# Patient Record
Sex: FEMALE | Race: WHITE | NOT HISPANIC OR LATINO | Employment: FULL TIME | URBAN - METROPOLITAN AREA
[De-identification: names, ages, dates, MRNs, and addresses within clinical notes are randomized per-mention and may not be internally consistent; named-entity substitution may affect disease eponyms.]

---

## 2023-04-19 RX ORDER — CONJUGATED ESTROGENS AND MEDROXYPROGESTERONE ACETATE .3; 1.5 MG/1; MG/1
TABLET, SUGAR COATED ORAL
COMMUNITY
Start: 2015-03-26 | End: 2023-12-07 | Stop reason: SDUPTHER

## 2023-04-19 RX ORDER — AZELAIC ACID 0.15 G/G
GEL TOPICAL
COMMUNITY
Start: 2015-02-19 | End: 2024-02-12

## 2023-04-19 RX ORDER — SOD SULF/POT CHLORIDE/MAG SULF 1.479 G
TABLET ORAL
COMMUNITY
Start: 2022-04-12 | End: 2023-04-20 | Stop reason: ALTCHOICE

## 2023-04-20 ENCOUNTER — OFFICE VISIT (OUTPATIENT)
Dept: PRIMARY CARE | Facility: CLINIC | Age: 63
End: 2023-04-20
Payer: COMMERCIAL

## 2023-04-20 VITALS
WEIGHT: 117 LBS | TEMPERATURE: 94.5 F | DIASTOLIC BLOOD PRESSURE: 70 MMHG | BODY MASS INDEX: 22.09 KG/M2 | SYSTOLIC BLOOD PRESSURE: 112 MMHG | HEIGHT: 61 IN

## 2023-04-20 DIAGNOSIS — R09.82 POST-NASAL DRIP: ICD-10-CM

## 2023-04-20 DIAGNOSIS — K58.0 IRRITABLE BOWEL SYNDROME WITH DIARRHEA: ICD-10-CM

## 2023-04-20 DIAGNOSIS — J32.0 CHRONIC MAXILLARY SINUSITIS: ICD-10-CM

## 2023-04-20 DIAGNOSIS — K21.9 GASTROESOPHAGEAL REFLUX DISEASE WITHOUT ESOPHAGITIS: Primary | ICD-10-CM

## 2023-04-20 PROBLEM — N95.1 MENOPAUSAL SYMPTOMS: Status: ACTIVE | Noted: 2023-04-20

## 2023-04-20 PROCEDURE — 1036F TOBACCO NON-USER: CPT | Performed by: PHYSICIAN ASSISTANT

## 2023-04-20 PROCEDURE — 99204 OFFICE O/P NEW MOD 45 MIN: CPT | Performed by: PHYSICIAN ASSISTANT

## 2023-04-20 RX ORDER — AZELASTINE 1 MG/ML
SPRAY, METERED NASAL
COMMUNITY
Start: 2022-09-21

## 2023-04-20 ASSESSMENT — ENCOUNTER SYMPTOMS
NAUSEA: 1
HEADACHES: 0
DIARRHEA: 1
COUGH: 0
MYALGIAS: 0
PALPITATIONS: 0
NUMBNESS: 0
TREMORS: 0
JOINT SWELLING: 0
ABDOMINAL PAIN: 1
ABDOMINAL DISTENTION: 0
LOSS OF SENSATION IN FEET: 0
DEPRESSION: 0
DIFFICULTY URINATING: 0
FREQUENCY: 0
ARTHRALGIAS: 0
EYE PAIN: 0
SHORTNESS OF BREATH: 0
CONSTIPATION: 0
NERVOUS/ANXIOUS: 0
CONFUSION: 0
HEMATURIA: 0
SORE THROAT: 0
WHEEZING: 0
EYE DISCHARGE: 0
POLYPHAGIA: 0
POLYDIPSIA: 0
WEAKNESS: 0
CHOKING: 0
APPETITE CHANGE: 0
CHILLS: 0
VOMITING: 0
FACIAL SWELLING: 0
CHEST TIGHTNESS: 0
FATIGUE: 0
ANAL BLEEDING: 0
COLOR CHANGE: 0
FEVER: 0
DIZZINESS: 0
SLEEP DISTURBANCE: 0
OCCASIONAL FEELINGS OF UNSTEADINESS: 0

## 2023-04-20 ASSESSMENT — PATIENT HEALTH QUESTIONNAIRE - PHQ9
10. IF YOU CHECKED OFF ANY PROBLEMS, HOW DIFFICULT HAVE THESE PROBLEMS MADE IT FOR YOU TO DO YOUR WORK, TAKE CARE OF THINGS AT HOME, OR GET ALONG WITH OTHER PEOPLE: NOT DIFFICULT AT ALL
1. LITTLE INTEREST OR PLEASURE IN DOING THINGS: SEVERAL DAYS
2. FEELING DOWN, DEPRESSED OR HOPELESS: NOT AT ALL
SUM OF ALL RESPONSES TO PHQ9 QUESTIONS 1 AND 2: 1

## 2023-04-20 ASSESSMENT — COLUMBIA-SUICIDE SEVERITY RATING SCALE - C-SSRS
1. IN THE PAST MONTH, HAVE YOU WISHED YOU WERE DEAD OR WISHED YOU COULD GO TO SLEEP AND NOT WAKE UP?: NO
6. HAVE YOU EVER DONE ANYTHING, STARTED TO DO ANYTHING, OR PREPARED TO DO ANYTHING TO END YOUR LIFE?: NO
2. HAVE YOU ACTUALLY HAD ANY THOUGHTS OF KILLING YOURSELF?: NO

## 2023-04-20 NOTE — PROGRESS NOTES
"Subjective   Patient ID: Tammy Posada is a 63 y.o. female with a history of chronic sinusitis, postnasal drip, menopausal symptoms on Prempro, s/p right ACL repair in 1999, IBS with diarrhea who presents for Abdominal Pain.    HPI the patient is presented to become established as a new patient with a primary care provider.  She developed stomach cramps, epigastric pain and bloating a week ago after eating \" rich Easter food\" which she is not used to since she usually eats healthy food and tries to avoid fried or junk food.  She was nauseated and had loose stools.  Denies vomiting.  She started a blend diet and Pepcid 10 mg which improved her symptoms.  She had firm stool this morning and no more nausea.  Denies melena or hematochezia. Her colonoscopy was done in June 2022.  Was found to have couple of polyps.  She follows with GYN regularly.  Her Pap smear is up-to-date.  Mammogram was ordered but the patient had not done it yet.  She exercises 4-5 times a week and once a week with a .  Also rides horses.  She does not smoke.    Review of Systems   Constitutional:  Negative for appetite change, chills, fatigue and fever.   HENT:  Negative for congestion, ear pain, facial swelling, hearing loss, nosebleeds and sore throat.    Eyes:  Negative for pain, discharge and visual disturbance.   Respiratory:  Negative for cough, choking, chest tightness, shortness of breath and wheezing.    Cardiovascular:  Negative for chest pain, palpitations and leg swelling.   Gastrointestinal:  Positive for abdominal pain, diarrhea and nausea. Negative for abdominal distention, anal bleeding, constipation and vomiting.   Endocrine: Negative for cold intolerance, heat intolerance, polydipsia, polyphagia and polyuria.   Genitourinary:  Negative for difficulty urinating, frequency, hematuria and urgency.   Musculoskeletal:  Negative for arthralgias, gait problem, joint swelling and myalgias.   Skin:  Negative for color " "change and rash.   Neurological:  Negative for dizziness, tremors, syncope, weakness, numbness and headaches.   Psychiatric/Behavioral:  Negative for behavioral problems, confusion, sleep disturbance and suicidal ideas. The patient is not nervous/anxious.        Objective   /70   Temp 34.7 °C (94.5 °F)   Ht 1.549 m (5' 1\")   Wt 53.1 kg (117 lb)   BMI 22.11 kg/m²     Physical Exam  Constitutional:       General: She is not in acute distress.     Appearance: Normal appearance.   HENT:      Head: Normocephalic and atraumatic.      Nose: Nose normal.   Eyes:      Extraocular Movements: Extraocular movements intact.      Conjunctiva/sclera: Conjunctivae normal.      Pupils: Pupils are equal, round, and reactive to light.   Cardiovascular:      Rate and Rhythm: Normal rate and regular rhythm.      Pulses: Normal pulses.      Heart sounds: Normal heart sounds.   Pulmonary:      Effort: Pulmonary effort is normal.      Breath sounds: Normal breath sounds.   Abdominal:      General: Bowel sounds are normal.      Palpations: Abdomen is soft.   Musculoskeletal:         General: Normal range of motion.      Cervical back: Normal range of motion and neck supple.   Neurological:      General: No focal deficit present.      Mental Status: She is alert and oriented to person, place, and time.   Psychiatric:         Mood and Affect: Mood normal.         Behavior: Behavior normal.         Thought Content: Thought content normal.         Judgment: Judgment normal.         Assessment/Plan   Epigastric pain  Due to acid reflux  Avoid caffeine and alcoholic beverages  Avoid spicy and acidic food, such as tomato and citrus fruits  Avoid onions, peppermint and spearmint   Eat small, frequent meals  Do not eat late at night, at least 3 hours before bed  Raise the head of the bed 6-8 inches for sleeping  Wear lose-fitting clothes around your waist   Take famotidine 20 mg 30 minutes before breakfast and 30 minutes before " dinner    Post nasal drip, chronic sinusitis  Continue azelastine nasal spray daily    IBS with diarrhea  Stable  Taking Metamucil daily     Menopausal symptoms  Continue Prempro as directed  Follow-up with GYN as scheduled    Health maintenance  Colonoscopy June 2022   Follow-up with GYN Dr. Sterling   Pap smear February 2022   Mammogram ordered by GYN, not scheduled yet    Continue to exercise 4-5 times a week  Continue riding horses  Continue healthy diet

## 2023-12-07 ENCOUNTER — TELEPHONE (OUTPATIENT)
Dept: OBSTETRICS AND GYNECOLOGY | Facility: CLINIC | Age: 63
End: 2023-12-07
Payer: COMMERCIAL

## 2023-12-07 DIAGNOSIS — N95.1 MENOPAUSAL SYMPTOMS: Primary | ICD-10-CM

## 2023-12-07 RX ORDER — CONJUGATED ESTROGENS AND MEDROXYPROGESTERONE ACETATE .3; 1.5 MG/1; MG/1
1 TABLET, SUGAR COATED ORAL DAILY
Qty: 30 TABLET | Refills: 2 | Status: SHIPPED | OUTPATIENT
Start: 2023-12-07 | End: 2024-02-12 | Stop reason: SDUPTHER

## 2024-02-12 ENCOUNTER — OFFICE VISIT (OUTPATIENT)
Dept: OBSTETRICS AND GYNECOLOGY | Facility: CLINIC | Age: 64
End: 2024-02-12
Payer: COMMERCIAL

## 2024-02-12 VITALS
SYSTOLIC BLOOD PRESSURE: 114 MMHG | DIASTOLIC BLOOD PRESSURE: 78 MMHG | WEIGHT: 116 LBS | HEIGHT: 63 IN | HEART RATE: 78 BPM | BODY MASS INDEX: 20.55 KG/M2

## 2024-02-12 DIAGNOSIS — N95.1 MENOPAUSAL SYMPTOMS: ICD-10-CM

## 2024-02-12 DIAGNOSIS — Z01.419 ENCOUNTER FOR GYNECOLOGICAL EXAMINATION WITHOUT ABNORMAL FINDING: Primary | ICD-10-CM

## 2024-02-12 PROCEDURE — 87624 HPV HI-RISK TYP POOLED RSLT: CPT

## 2024-02-12 PROCEDURE — 1036F TOBACCO NON-USER: CPT | Performed by: OBSTETRICS & GYNECOLOGY

## 2024-02-12 PROCEDURE — 99396 PREV VISIT EST AGE 40-64: CPT | Performed by: OBSTETRICS & GYNECOLOGY

## 2024-02-12 PROCEDURE — 88175 CYTOPATH C/V AUTO FLUID REDO: CPT

## 2024-02-12 RX ORDER — FLUTICASONE PROPIONATE 50 MCG
1 SPRAY, SUSPENSION (ML) NASAL DAILY
COMMUNITY

## 2024-02-12 RX ORDER — BISMUTH SUBSALICYLATE 262 MG
1 TABLET,CHEWABLE ORAL DAILY
COMMUNITY

## 2024-02-12 RX ORDER — CONJUGATED ESTROGENS AND MEDROXYPROGESTERONE ACETATE .3; 1.5 MG/1; MG/1
1 TABLET, SUGAR COATED ORAL DAILY
Qty: 90 TABLET | Refills: 3 | Status: SHIPPED | OUTPATIENT
Start: 2024-02-12 | End: 2025-02-11

## 2024-02-12 RX ORDER — ERGOCALCIFEROL 1.25 MG/1
1.25 CAPSULE ORAL
COMMUNITY

## 2024-02-12 NOTE — PROGRESS NOTES
"Subjective   Tammy Posada is a 63 y.o. female here for a routine exam. Current complaints: We discussed that she continues Prempro which she finds highly enhances her quality of life.  She has discontinued in the past but finds there is \"brain fog\".  She requests a refill.    There is no postmenopausal bleeding or pelvic pain.  No dysuria, no change in bowel habits or vaginal discharge.  She occasionally has stress incontinence symptoms.  I recommend Kegel exercises.    She is current on her colonoscopy.    She does have a history of LEEP biopsy in the past.. Personal health questionnaire reviewed: yes.     Gynecologic History  No LMP recorded. Patient is postmenopausal.  Contraception: post menopausal status  Last Pap: 22. Results were: normal  Last mammogram: 23. Results were: normal    Obstetric History  OB History    Para Term  AB Living   1 1           SAB IAB Ectopic Multiple Live Births                  # Outcome Date GA Lbr Gilberto/2nd Weight Sex Delivery Anes PTL Lv   1 Para                Objective   Constitutional: Alert and in no acute distress. Well developed, well nourished.   Head and Face: Head and face: Normal.    Eyes: Normal external exam - nonicteric sclera, extraocular movements intact (EOMI) and no ptosis.   Neck: No neck asymmetry. Supple. Thyroid not enlarged and there were no palpable thyroid nodules.    Pulmonary: No respiratory distress.   Chest: Breasts: Normal appearance, no nipple discharge and no skin changes. Palpation of breasts and axillae: No palpable mass and no axillary lymphadenopathy.   Abdomen: Soft nontender; no abdominal mass palpated. No organomegaly. No hernias.   Genitourinary: External genitalia: Normal. No inguinal lymphadenopathy. Bartholin's Urethral and Skenes Glands: Normal. Urethra: Normal.  Bladder: Normal on palpation. Vagina: Normal. Cervix: Normal.  Uterus: Normal.  Right Adnexa/parametria: Normal.  Left Adnexa/parametria: Normal.  " Inspection of Perianal Area: Normal.   Musculoskeletal: No joint swelling seen, normal movements of all extremities.   Skin: Normal skin color and pigmentation, normal skin turgor, and no rash.   Neurologic: Non-focal. Grossly intact.   Psychiatric: Alert and oriented x 3. Affect normal to patient baseline. Mood: Appropriate.  Physical Exam     Assessment/Plan   Healthy female exam.  This is a 63-year-old female with a normal exam.  A Pap smear was sent, she has a history of a LEEP biopsy.    Her routine mammogram is due May 2024.    A printed prescription for Prempro was given to the patient.  She declines discontinuing as it is greatly improving her menopausal symptoms.  I will see her routinely in 1 year.  Mammogram ordered.

## 2024-03-01 LAB
CYTOLOGY CMNT CVX/VAG CYTO-IMP: NORMAL
HPV HR 12 DNA GENITAL QL NAA+PROBE: NEGATIVE
HPV HR GENOTYPES PNL CVX NAA+PROBE: NEGATIVE
HPV16 DNA SPEC QL NAA+PROBE: NEGATIVE
HPV18 DNA SPEC QL NAA+PROBE: NEGATIVE
LAB AP CONTRACEPTIVE HISTORY: NORMAL
LAB AP HPV GENOTYPE QUESTION: YES
LAB AP HPV HR: NORMAL
LAB AP TREATMENT HISTORY: NORMAL
LABORATORY COMMENT REPORT: NORMAL
MENSTRUAL HX REPORTED: NORMAL
PATH REPORT.TOTAL CANCER: NORMAL

## 2024-03-19 ENCOUNTER — TELEPHONE (OUTPATIENT)
Dept: OBSTETRICS AND GYNECOLOGY | Facility: CLINIC | Age: 64
End: 2024-03-19
Payer: COMMERCIAL

## 2024-04-30 DIAGNOSIS — Z12.31 VISIT FOR SCREENING MAMMOGRAM: Primary | ICD-10-CM

## 2024-05-10 ENCOUNTER — HOSPITAL ENCOUNTER (OUTPATIENT)
Dept: RADIOLOGY | Facility: HOSPITAL | Age: 64
Discharge: HOME | End: 2024-05-10
Payer: COMMERCIAL

## 2024-05-10 VITALS — HEIGHT: 63 IN | BODY MASS INDEX: 19.84 KG/M2 | WEIGHT: 112 LBS

## 2024-05-10 DIAGNOSIS — Z12.31 ENCOUNTER FOR SCREENING MAMMOGRAM FOR MALIGNANT NEOPLASM OF BREAST: ICD-10-CM

## 2024-05-10 DIAGNOSIS — Z01.419 ENCOUNTER FOR GYNECOLOGICAL EXAMINATION WITHOUT ABNORMAL FINDING: ICD-10-CM

## 2024-05-10 PROCEDURE — 77067 SCR MAMMO BI INCL CAD: CPT | Performed by: RADIOLOGY

## 2024-05-10 PROCEDURE — 77063 BREAST TOMOSYNTHESIS BI: CPT | Performed by: RADIOLOGY

## 2024-05-10 PROCEDURE — 77067 SCR MAMMO BI INCL CAD: CPT

## 2024-09-16 NOTE — TELEPHONE ENCOUNTER
"Patient is calling back about her Prempro and cutting the dose in half. I relayed your message \"Yes,  I believe you can cut them in half.  Only the enteric-coated medications need to be taken whole.\" Patient states she believes the medication is enteric-coated. Please advise. Patient contact number has been verified.    English Dwyer    "
PT has a delay in her Prempro from [facility], her daughter was on a stronger dose, she's wondering if she can cut them in half and take her's in the mean time. SEAN  
Talk to the patient.  She is on Prempro low-dose, but it is unavailable for about another month.  She questions if she could use her daughters Prempro 0.625 and break it in half.  I discussed she can cut it in half, and alternative could be to order the Premarin and the Provera separately if she desires.  
Yes,  I believe you can cut them in half.  Only the enteric-coated medications need to be taken whole.  
no

## 2025-01-20 ENCOUNTER — TELEPHONE (OUTPATIENT)
Facility: CLINIC | Age: 65
End: 2025-01-20
Payer: COMMERCIAL

## 2025-01-20 DIAGNOSIS — N95.1 MENOPAUSAL SYMPTOMS: ICD-10-CM

## 2025-01-20 RX ORDER — CONJUGATED ESTROGENS AND MEDROXYPROGESTERONE ACETATE .3; 1.5 MG/1; MG/1
1 TABLET, SUGAR COATED ORAL DAILY
Qty: 90 TABLET | Refills: 3 | Status: SHIPPED | OUTPATIENT
Start: 2025-01-20 | End: 2026-01-20

## 2025-01-20 NOTE — TELEPHONE ENCOUNTER
PT needs Prempro rx mailed to her so she can get it on time. [place].     Chelita Acosta MA

## 2025-02-17 ENCOUNTER — APPOINTMENT (OUTPATIENT)
Dept: OBSTETRICS AND GYNECOLOGY | Facility: CLINIC | Age: 65
End: 2025-02-17
Payer: COMMERCIAL

## 2025-02-20 ENCOUNTER — APPOINTMENT (OUTPATIENT)
Facility: CLINIC | Age: 65
End: 2025-02-20
Payer: COMMERCIAL

## 2025-02-20 VITALS
SYSTOLIC BLOOD PRESSURE: 136 MMHG | BODY MASS INDEX: 21.61 KG/M2 | HEART RATE: 84 BPM | WEIGHT: 122 LBS | DIASTOLIC BLOOD PRESSURE: 86 MMHG

## 2025-02-20 DIAGNOSIS — Z78.0 ASYMPTOMATIC MENOPAUSAL STATE: Primary | ICD-10-CM

## 2025-02-20 DIAGNOSIS — Z01.419 ENCOUNTER FOR GYNECOLOGICAL EXAMINATION WITHOUT ABNORMAL FINDING: ICD-10-CM

## 2025-02-20 NOTE — PROGRESS NOTES
Subjective   Tammy Posada is a 65 y.o. female here for a routine exam.  She has no postmenopausal bleeding or discharge.  No dysuria or change in bowel habits.  She is current on her colonoscopy from 2022.   She has history of LEEP in the early .    She has recently had a big promotion although there is more stress.    Personal health questionnaire reviewed: yes.     Gynecologic History  No LMP recorded. Patient is postmenopausal.  Contraception: post menopausal status  Last Pap: 24. Results were: normal  Last mammogram: 5/10/24. Results were: normal    Obstetric History  OB History    Para Term  AB Living   1 1 1         SAB IAB Ectopic Multiple Live Births                  # Outcome Date GA Lbr Gilberto/2nd Weight Sex Type Anes PTL Lv   1 Term                Objective   Constitutional: Alert and in no acute distress. Well developed, well nourished.   Head and Face: Head and face: Normal.    Eyes: Normal external exam - nonicteric sclera, extraocular movements intact (EOMI) and no ptosis.   Neck: No neck asymmetry. Supple. Thyroid not enlarged and there were no palpable thyroid nodules.    Pulmonary: No respiratory distress.   Chest: Breasts: Normal appearance, no nipple discharge and no skin changes. Palpation of breasts and axillae: No palpable mass and no axillary lymphadenopathy.   Abdomen: Soft nontender; no abdominal mass palpated. No organomegaly. No hernias.   Genitourinary: External genitalia: Normal. No inguinal lymphadenopathy. Bartholin's Urethral and Skenes Glands: Normal. Urethra: Normal.  Bladder: Normal on palpation. Vagina: Normal. Cervix: Normal.  Uterus: Normal.  Right Adnexa/parametria: Normal.  Left Adnexa/parametria: Normal.  Inspection of Perianal Area: Normal.   Musculoskeletal: No joint swelling seen, normal movements of all extremities.   Skin: Normal skin color and pigmentation, normal skin turgor, and no rash.   Neurologic: Non-focal. Grossly intact.    Psychiatric: Alert and oriented x 3. Affect normal to patient baseline. Mood: Appropriate.  Physical Exam     Assessment/Plan   Healthy female exam.  This is a 65-year-old female with a normal exam.  No Pap smear was sent, she is high risk HPV negative in 2024.    Her routine mammogram is due in May 2025.    We discussed a bone density test to evaluate for osteoporosis.  She does use Prempro for menopausal symptoms and is current on prescription through [facility].    I will see her routinely in 1 year.  Education reviewed: self breast exams.  Mammogram ordered.

## 2025-04-11 ENCOUNTER — OFFICE VISIT (OUTPATIENT)
Dept: PRIMARY CARE | Facility: CLINIC | Age: 65
End: 2025-04-11
Payer: COMMERCIAL

## 2025-04-11 VITALS
TEMPERATURE: 96.8 F | DIASTOLIC BLOOD PRESSURE: 72 MMHG | WEIGHT: 116 LBS | SYSTOLIC BLOOD PRESSURE: 110 MMHG | HEIGHT: 60 IN | BODY MASS INDEX: 22.78 KG/M2

## 2025-04-11 DIAGNOSIS — H69.93 DISORDER OF BOTH EUSTACHIAN TUBES: ICD-10-CM

## 2025-04-11 DIAGNOSIS — R42 DIZZINESS: Primary | ICD-10-CM

## 2025-04-11 PROCEDURE — 1036F TOBACCO NON-USER: CPT | Performed by: PHYSICIAN ASSISTANT

## 2025-04-11 PROCEDURE — 3008F BODY MASS INDEX DOCD: CPT | Performed by: PHYSICIAN ASSISTANT

## 2025-04-11 PROCEDURE — 1158F ADVNC CARE PLAN TLK DOCD: CPT | Performed by: PHYSICIAN ASSISTANT

## 2025-04-11 PROCEDURE — 1160F RVW MEDS BY RX/DR IN RCRD: CPT | Performed by: PHYSICIAN ASSISTANT

## 2025-04-11 PROCEDURE — 1125F AMNT PAIN NOTED PAIN PRSNT: CPT | Performed by: PHYSICIAN ASSISTANT

## 2025-04-11 PROCEDURE — 99213 OFFICE O/P EST LOW 20 MIN: CPT | Performed by: PHYSICIAN ASSISTANT

## 2025-04-11 PROCEDURE — 1159F MED LIST DOCD IN RCRD: CPT | Performed by: PHYSICIAN ASSISTANT

## 2025-04-11 PROCEDURE — 1123F ACP DISCUSS/DSCN MKR DOCD: CPT | Performed by: PHYSICIAN ASSISTANT

## 2025-04-11 RX ORDER — CIPROFLOXACIN AND DEXAMETHASONE 3; 1 MG/ML; MG/ML
SUSPENSION/ DROPS AURICULAR (OTIC)
COMMUNITY
Start: 2025-03-17

## 2025-04-11 RX ORDER — MECLIZINE HYDROCHLORIDE 25 MG/1
25 TABLET ORAL 3 TIMES DAILY PRN
Qty: 90 TABLET | Refills: 2 | Status: SHIPPED | OUTPATIENT
Start: 2025-04-11 | End: 2026-04-11

## 2025-04-11 RX ORDER — PREDNISONE 5 MG/1
1 TABLET ORAL
COMMUNITY
Start: 2024-12-18

## 2025-04-11 RX ORDER — METHYLPREDNISOLONE 4 MG/1
TABLET ORAL
Qty: 21 TABLET | Refills: 0 | Status: SHIPPED | OUTPATIENT
Start: 2025-04-11 | End: 2025-04-17

## 2025-04-11 RX ORDER — AZITHROMYCIN 250 MG/1
TABLET, FILM COATED ORAL
COMMUNITY
Start: 2025-03-18

## 2025-04-11 ASSESSMENT — ENCOUNTER SYMPTOMS
WHEEZING: 0
HEMATURIA: 0
NAUSEA: 0
ABDOMINAL PAIN: 0
ARTHRALGIAS: 0
WEAKNESS: 0
COUGH: 0
LOSS OF SENSATION IN FEET: 0
SORE THROAT: 0
DIFFICULTY URINATING: 0
FREQUENCY: 0
DEPRESSION: 0
ABDOMINAL DISTENTION: 0
CONSTIPATION: 0
CONFUSION: 0
EYE DISCHARGE: 0
ANAL BLEEDING: 0
CHOKING: 0
COLOR CHANGE: 0
VOMITING: 0
NERVOUS/ANXIOUS: 0
EYE PAIN: 0
OCCASIONAL FEELINGS OF UNSTEADINESS: 1
POLYDIPSIA: 0
CHILLS: 0
CHEST TIGHTNESS: 0
FEVER: 0
DIZZINESS: 1
FATIGUE: 0
HEADACHES: 0
FACIAL SWELLING: 0
DIARRHEA: 0
SLEEP DISTURBANCE: 0
NUMBNESS: 0
MYALGIAS: 0
JOINT SWELLING: 0
TREMORS: 0
PALPITATIONS: 0
SHORTNESS OF BREATH: 0
APPETITE CHANGE: 0
POLYPHAGIA: 0

## 2025-04-11 ASSESSMENT — PATIENT HEALTH QUESTIONNAIRE - PHQ9
SUM OF ALL RESPONSES TO PHQ9 QUESTIONS 1 AND 2: 0
1. LITTLE INTEREST OR PLEASURE IN DOING THINGS: NOT AT ALL
2. FEELING DOWN, DEPRESSED OR HOPELESS: NOT AT ALL

## 2025-04-11 ASSESSMENT — COLUMBIA-SUICIDE SEVERITY RATING SCALE - C-SSRS
6. HAVE YOU EVER DONE ANYTHING, STARTED TO DO ANYTHING, OR PREPARED TO DO ANYTHING TO END YOUR LIFE?: NO
2. HAVE YOU ACTUALLY HAD ANY THOUGHTS OF KILLING YOURSELF?: NO
1. IN THE PAST MONTH, HAVE YOU WISHED YOU WERE DEAD OR WISHED YOU COULD GO TO SLEEP AND NOT WAKE UP?: NO

## 2025-04-11 ASSESSMENT — PAIN SCALES - GENERAL: PAINLEVEL_OUTOF10: 2

## 2025-04-11 NOTE — PROGRESS NOTES
Subjective   Patient ID: Tammy Posada is a 65 y.o. female with a history of chronic sinusitis, postnasal drip, menopausal symptoms on Prempro, s/p right ACL repair in 1999, IBS with diarrhea who presents for Vertigo, Ear Fullness (Left Ear:  Some pain and pressure/Onset:  approx. 3 weeks), and Sinusitis.    HPI the patient is complaining of dizziness. She had sinus infection 3 weeks ago and developed dizziness.  She went to the urgent care and was given otic drops but they did not improve her symptoms. She started PT, saw chiropractor and did Apley maneuver at home but it did not improve her dizziness.  He has some pressure in her ears but denies any pain. Stated that it was better with Sudafed.    Review of Systems   Constitutional:  Negative for appetite change, chills, fatigue and fever.   HENT:  Positive for congestion. Negative for ear pain, facial swelling, hearing loss, nosebleeds and sore throat.    Eyes:  Negative for pain, discharge and visual disturbance.   Respiratory:  Negative for cough, choking, chest tightness, shortness of breath and wheezing.    Cardiovascular:  Negative for chest pain, palpitations and leg swelling.   Gastrointestinal:  Negative for abdominal distention, abdominal pain, anal bleeding, constipation, diarrhea, nausea and vomiting.   Endocrine: Negative for cold intolerance, heat intolerance, polydipsia, polyphagia and polyuria.   Genitourinary:  Negative for difficulty urinating, frequency, hematuria and urgency.   Musculoskeletal:  Negative for arthralgias, gait problem, joint swelling and myalgias.   Skin:  Negative for color change and rash.   Neurological:  Positive for dizziness. Negative for tremors, syncope, weakness, numbness and headaches.   Psychiatric/Behavioral:  Negative for behavioral problems, confusion, sleep disturbance and suicidal ideas. The patient is not nervous/anxious.        Objective   /72 (Patient Position: Sitting)   Temp 36 °C (96.8 °F) (Temporal)    Ht 1.524 m (5')   Wt 52.6 kg (116 lb)   Breastfeeding No   BMI 22.65 kg/m²     Physical Exam  Constitutional:       General: She is not in acute distress.     Appearance: Normal appearance.   HENT:      Head: Normocephalic and atraumatic.      Nose: Nose normal.   Eyes:      Extraocular Movements: Extraocular movements intact.      Conjunctiva/sclera: Conjunctivae normal.      Pupils: Pupils are equal, round, and reactive to light.   Cardiovascular:      Rate and Rhythm: Normal rate and regular rhythm.      Pulses: Normal pulses.      Heart sounds: Normal heart sounds.   Pulmonary:      Effort: Pulmonary effort is normal.      Breath sounds: Normal breath sounds.   Abdominal:      General: Bowel sounds are normal.      Palpations: Abdomen is soft.   Musculoskeletal:         General: Normal range of motion.      Cervical back: Normal range of motion and neck supple.   Neurological:      General: No focal deficit present.      Mental Status: She is alert and oriented to person, place, and time.   Psychiatric:         Mood and Affect: Mood normal.         Behavior: Behavior normal.         Thought Content: Thought content normal.         Judgment: Judgment normal.         Assessment/Plan   Dizziness, ear pressure  Due to eustachian tube dysfunction sinus infection  Start Medrol Dosepak as directed  Take meclizine 25 mg 3 times a day as needed  Drink plenty of water to stay hydrated  Call if no improvement    Acid reflux  Avoid caffeine and alcoholic beverages  Avoid spicy and acidic food, such as tomato and citrus fruits  Avoid onions, peppermint and spearmint   Eat small, frequent meals  Do not eat late at night, at least 3 hours before bed  Raise the head of the bed 6-8 inches for sleeping  Wear lose-fitting clothes around your waist   Take famotidine 20 mg 30 minutes before breakfast and 30 minutes before dinner    Post nasal drip, chronic sinusitis  Continue azelastine nasal spray daily    IBS with  diarrhea  Stable  Taking Metamucil daily     Menopausal symptoms  Continue Prempro as directed  Follow-up with GYN as scheduled    Health maintenance   Colonoscopy June 2022, due this June   Follow-up with GYN Dr. Sterling   Pap smear February 2022   Mammogram ordered by GYN, not scheduled yet    Continue to exercise 4-5 times a week  Continue riding horses  Continue healthy diet

## 2025-04-17 DIAGNOSIS — H69.93 DISORDER OF BOTH EUSTACHIAN TUBES: Primary | ICD-10-CM

## 2025-04-17 RX ORDER — METHYLPREDNISOLONE 4 MG/1
TABLET ORAL
Qty: 21 TABLET | Refills: 0 | Status: SHIPPED | OUTPATIENT
Start: 2025-04-17 | End: 2025-04-23

## 2025-05-06 ENCOUNTER — APPOINTMENT (OUTPATIENT)
Dept: OTOLARYNGOLOGY | Facility: CLINIC | Age: 65
End: 2025-05-06
Payer: COMMERCIAL

## 2025-05-06 VITALS — WEIGHT: 116 LBS | BODY MASS INDEX: 22.65 KG/M2

## 2025-05-06 DIAGNOSIS — R42 DIZZINESS AND GIDDINESS: ICD-10-CM

## 2025-05-06 DIAGNOSIS — J32.9 CHRONIC SINUSITIS, UNSPECIFIED LOCATION: Primary | ICD-10-CM

## 2025-05-06 DIAGNOSIS — H69.93 DISORDER OF BOTH EUSTACHIAN TUBES: ICD-10-CM

## 2025-05-06 PROCEDURE — 1159F MED LIST DOCD IN RCRD: CPT | Performed by: GENERAL PRACTICE

## 2025-05-06 PROCEDURE — 1036F TOBACCO NON-USER: CPT | Performed by: GENERAL PRACTICE

## 2025-05-06 PROCEDURE — 99204 OFFICE O/P NEW MOD 45 MIN: CPT | Performed by: GENERAL PRACTICE

## 2025-05-06 PROCEDURE — G8433 SCR FOR DEP NOT CPT DOC RSN: HCPCS | Performed by: GENERAL PRACTICE

## 2025-05-06 RX ORDER — TRIAMCINOLONE ACETONIDE 55 UG/1
2 SPRAY, METERED NASAL DAILY
Qty: 16.5 G | Refills: 3 | Status: SHIPPED | OUTPATIENT
Start: 2025-05-06

## 2025-05-06 RX ORDER — CLARITHROMYCIN 500 MG/1
500 TABLET, FILM COATED ORAL 2 TIMES DAILY
Qty: 42 TABLET | Refills: 0 | Status: SHIPPED | OUTPATIENT
Start: 2025-05-06 | End: 2025-05-27

## 2025-05-06 RX ORDER — GUAIFENESIN AND PSEUDOEPHEDRINE HCL 600; 60 MG/1; MG/1
1 TABLET, EXTENDED RELEASE ORAL EVERY 12 HOURS
Qty: 60 TABLET | Refills: 0 | Status: SHIPPED | OUTPATIENT
Start: 2025-05-06

## 2025-05-06 NOTE — PROGRESS NOTES
Otolaryngology - Head and Neck Surgery Outpatient New Patient Visit Note        Assessment/Plan:   Problem List Items Addressed This Visit           ICD-10-CM       ENT    Disorder of both eustachian tubes H69.93    Relevant Medications    triamcinolone (Nasacort) 55 mcg nasal inhaler    pseudoephedrine-guaifenesin (Mucinex D)  mg 12 hr tablet    Other Relevant Orders    Referral to Audiology     Other Visit Diagnoses         Codes      Chronic sinusitis, unspecified location    -  Primary J32.9    Relevant Medications    clarithromycin (Biaxin) 500 mg tablet    Other Relevant Orders    CT sinus wo IV contrast      Dizziness and giddiness     R42            65yoF with ETD symptoms, recent OME and some recurrent dizziness.     No otitis on exam.   Concerns for ongoing sinusitis, will treat with biaxin/medrol and acquire treated CT sinus.    Discussed controls for rhinitis and ETD    Dizziness associated with left neck muscle tension.  Will have pt discuss possible cervicogenic dizziness with PT.            Follow up:  -plan for follow up in clinic as needed, after completion of ordered studies, and in 1-2 months    All of the above findings, impressions, treatment planning and follow up plans were discussed with the patient who indicated understanding.  the patient was instructed to contact or return to clinic sooner if symptoms/signs persist or worsen despite the above management.      Ryan Frank MD  Otolaryngology - Head and Neck Surgery            History Of Present Illness  Tammy Posada is a 65 y.o. female presenting for concerns for ear pressure/fullness, sinus pressure/pain, discolored rhinorrhea/PND and recent dizziness.     Reports a history of CRS with intermittent sinusitis.   Some onset of symptoms approx 6 weeks ago.   Treated with azithromycin with benefit but not resolution.   Reports ongoing congestion and ear pressure.   Noted to have left sided OME by PCM.  Treated with medrol with some  relief.     Reports known deviated septum associated with prior nasal trauma.   No prior nasal surgery.     Reports recent dizziness described as RS vertigo and imbalance intermittently.   Noted onset after turning head to left and setting something down.   Concerns for BPPV but underwent treatments with epley and vestib PT without resolution.   Reports a history of left neck/muscle tension/strain.      No changes in hearing, tinnitus or aural fullness associated with dizziness.                 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    Surgical History  She has a past surgical history that includes Laparoscopy diagnostic / biopsy / aspiration / lysis (04/24/2014) and Knee surgery (04/24/2014).     Social History  She reports that she quit smoking about 17 years ago. Her smoking use included cigarettes. She started smoking about 46 years ago. She has a 14.5 pack-year smoking history. She has never used smokeless tobacco. She reports that she does not drink alcohol and does not use drugs.    Family History  Family History[1]     Allergies  Pollen extracts and Prochlorperazine    Review of Systems  ROS: Pertinent positives as noted in HPI.    - CONSTITUTIONAL: Does not report weight loss, fever or chills.    - HEENT:   Ear: Does not report tinnitus,  , hearing loss, otalgia, otorrhea  Nose: Does not report  ,  , epistaxis, decreased smell  Throat: Does not report pain, dysphagia, odynophagia  Larynx: Does  not report hoarseness,  difficulty breathing, pain with speaking (odynophonia)  Neck: Does not report new masses, pain, swelling  Face: Does not report    ,  , swelling, numbness, weakness     - RESPIRATORY: Does not report SOB or cough.    - CV: Does not report palpitations or chest pain.     - GI: Does not report abdominal pain, nausea, vomiting or diarrhea.    - : Does not report dysuria or urinary frequency.    - MSK: Does not report myalgia or joint pain.    - SKIN: Does not report rash or pruritus.    - NEUROLOGICAL: Does not report headache or syncope.    - PSYCHIATRIC: Does not report recent changes in mood. Does not report anxiety or depression.         Physical Exam:     GENERAL:   Alert & Oriented to person, place and time; Normal affect and appearance. Well developed and well nourished. Conversant & cooperative with examination.     HEAD:   Normocephalic, atraumatic. No sinus tenderness to palpation. Normal parotid bilaterally. Normal facial strength.     NEUROLOGIC:   Cranial nerves II-XII grossly intact, gait WNL. Normal mood and affect.    EYES:   Extraocular movements intact. Pupils equal, round, reactive to light and accommodation. No nystagmus, no ptosis. no scleral injection.    EAR:   Normal auricle. No discomfort or TTP with manipulation.   Handheld otoscopic exam showed normal external auditory canals bilaterally. No purulence or EAC inflammation. Minimal cerumen.   Right tympanic membrane clear and mobile without evidence of perforation, retraction or middle ear effusion.   Left tympanic membrane clear and mobile without evidence of perforation, retraction or middle ear effusion.     NOSE:   No external deformity. No external nasal lesions, lacerations, or scars. Nasal tip symmetrical with normal nasal valves.   Nasal cavity with posterior rightward   septum, edematous  mucosa and turbinates. No lesions, masses, purulence or polyps.     OC/OP:   Mucous membranes moist, no masses, lesions or  exudates.   Normal tongue, floor of mouth, teeth, gums, lips. Normal posterior pharyngeal wall.    Normal tonsils without erythema, exudate or obvious calculi     NECK:   No neck masses or thyroid enlargement. Trachea midline. No tenderness to palpation    LYMPHATIC:   No cervical lymphadenopathy.     RESPIRATORY:   Symmetric chest elevation & no retractions. No significant hoarseness. No increased work of breathing.    CV:   No clubbing or cyanosis. No obvious edema    Skin:   No facial rashes, vesicles or lesions.     Extremities:   No gross abnormalities      Clinic Procedure        Information review:  External sources (notes, imaging, lab results) listed below personally reviewed to aid in medical decision making process.  -PCM 4/11/25  - 3/17/25  -PCM 4/20/23         [1]   Family History  Problem Relation Name Age of Onset    Hypertension Mother      Prostate cancer Father      Diabetes Father      Hypertension Brother

## 2025-05-09 ENCOUNTER — TELEPHONE (OUTPATIENT)
Dept: OTOLARYNGOLOGY | Facility: CLINIC | Age: 65
End: 2025-05-09
Payer: COMMERCIAL

## 2025-05-09 DIAGNOSIS — J32.9 CHRONIC SINUSITIS, UNSPECIFIED LOCATION: Primary | ICD-10-CM

## 2025-05-09 RX ORDER — SULFAMETHOXAZOLE AND TRIMETHOPRIM 800; 160 MG/1; MG/1
1 TABLET ORAL 2 TIMES DAILY
Qty: 28 TABLET | Refills: 0 | Status: SHIPPED | OUTPATIENT
Start: 2025-05-09 | End: 2025-05-23

## 2025-05-09 NOTE — TELEPHONE ENCOUNTER
Patient states the Biaxin really upset her stomach. She couldn't tolerate taking it. She also has the same issue with Augmentin. Is there something else you can give her. [facility] ([facility]) for pharmacy. Please advise.

## 2025-05-17 ENCOUNTER — HOSPITAL ENCOUNTER (OUTPATIENT)
Dept: RADIOLOGY | Facility: HOSPITAL | Age: 65
Discharge: HOME | End: 2025-05-17
Payer: COMMERCIAL

## 2025-05-17 DIAGNOSIS — J32.9 CHRONIC SINUSITIS, UNSPECIFIED LOCATION: ICD-10-CM

## 2025-05-17 PROCEDURE — 70486 CT MAXILLOFACIAL W/O DYE: CPT | Performed by: RADIOLOGY

## 2025-05-17 PROCEDURE — 70486 CT MAXILLOFACIAL W/O DYE: CPT

## 2025-05-19 ENCOUNTER — APPOINTMENT (OUTPATIENT)
Dept: RADIOLOGY | Facility: HOSPITAL | Age: 65
End: 2025-05-19
Payer: COMMERCIAL

## 2025-05-27 ENCOUNTER — APPOINTMENT (OUTPATIENT)
Dept: RADIOLOGY | Facility: HOSPITAL | Age: 65
End: 2025-05-27
Payer: COMMERCIAL

## 2025-05-27 VITALS — HEIGHT: 60 IN | WEIGHT: 116 LBS | BODY MASS INDEX: 22.78 KG/M2

## 2025-05-27 DIAGNOSIS — Z12.31 ENCOUNTER FOR SCREENING MAMMOGRAM FOR MALIGNANT NEOPLASM OF BREAST: ICD-10-CM

## 2025-05-27 DIAGNOSIS — Z12.31 VISIT FOR SCREENING MAMMOGRAM: ICD-10-CM

## 2025-05-27 PROCEDURE — 77063 BREAST TOMOSYNTHESIS BI: CPT | Performed by: RADIOLOGY

## 2025-05-27 PROCEDURE — 77067 SCR MAMMO BI INCL CAD: CPT

## 2025-05-27 PROCEDURE — 77067 SCR MAMMO BI INCL CAD: CPT | Performed by: RADIOLOGY

## 2025-06-03 ENCOUNTER — CLINICAL SUPPORT (OUTPATIENT)
Dept: AUDIOLOGY | Facility: CLINIC | Age: 65
End: 2025-06-03
Payer: COMMERCIAL

## 2025-06-03 DIAGNOSIS — R42 DIZZINESS: Primary | ICD-10-CM

## 2025-06-03 DIAGNOSIS — H69.93 DISORDER OF BOTH EUSTACHIAN TUBES: ICD-10-CM

## 2025-06-03 PROCEDURE — 92550 TYMPANOMETRY & REFLEX THRESH: CPT | Mod: 52 | Performed by: AUDIOLOGIST

## 2025-06-03 PROCEDURE — 92557 COMPREHENSIVE HEARING TEST: CPT | Performed by: AUDIOLOGIST

## 2025-06-03 ASSESSMENT — PAIN SCALES - GENERAL: PAINLEVEL_OUTOF10: 0 - NO PAIN

## 2025-06-03 ASSESSMENT — PAIN - FUNCTIONAL ASSESSMENT: PAIN_FUNCTIONAL_ASSESSMENT: 0-10

## 2025-06-03 NOTE — PROGRESS NOTES
"HISTORY:  Beginning in March she had a \"dizzy spell\".    She has pressure in both ears but worse in the left ear.    Occasional tinnitus in both ears.    She states that when she gets dizzy (room spinning) the left ear plugs up.  Once the spinning stops the ear un-blocks and returns to normal.    Sometimes when the pressure is great in her ears she gets nauseous.    She sees a physical therapist that performed the Epley Maneuver on her.  She felt no improvement.    Bad accident many years ago falling off of a horse.  She had a concussion at that time, and hurt her neck and lower back.      RESULTS:  Otoscopic inspection showed clear ear canals bilaterally.    Immittance testing showed normal tympanograms bilaterally.   Ipsilateral acoustic reflexes were tested at 500-4000Hz in both ears.  They were present at 500-4000Hz in the left ear and absent in the right ear.    Contralateral acoustic reflexes were tested at 500-4000Hz and were absent with the stimulus left and essentially present with the stimulus right  Distortion Product Otoacoustic Emission (DPOAE) testing was completed on both ears for the frequencies   2000-8000Hz.  The patient passed at 2000-6000Hz in the left ear and at 2000-5000Hz in the right ear indicating normal cochlear outer hair cell function at those frequencies.  Pure tone air and bone conduction testing showed normal hearing at 125-8000Hz in both ears.    Word discrimination scores were excellent bilaterally.  QuickSIN testing was completed at 70dB HL in each ear individually.   SNR = Signal to Noise Ratio  Right ear = 1.5 SNR loss                Left ear = 3.5 SNR loss                 0-3dB = Normal / Near normal and may hear better than thos with n ormal hearing are able to in noise.   3-7dB = Mild SNR loss and may hear almost as well as those with normal hearing are able to hear in noise.    7-15dB = Moderate SNR loss and may need hearing aids with directional microphones.  >15dB = Severe " SNR loss and a maximum SNR improvement is needed. Consider FM system in conjunction with hearing aids.        IMPRESSIONS:  Tammy has normal hearing in both ears.  She will follow up with Dr. Frank on 6/17/2025.    Treatment Plan:   Follow up with Dr. Frank.   Retest hearing in conjunction with otologic care.     TIME:  1404-6151

## 2025-06-09 ENCOUNTER — APPOINTMENT (OUTPATIENT)
Dept: OTOLARYNGOLOGY | Facility: CLINIC | Age: 65
End: 2025-06-09
Payer: COMMERCIAL

## 2025-06-17 ENCOUNTER — APPOINTMENT (OUTPATIENT)
Dept: OTOLARYNGOLOGY | Facility: CLINIC | Age: 65
End: 2025-06-17
Payer: COMMERCIAL

## 2025-06-17 VITALS — BODY MASS INDEX: 22.65 KG/M2 | WEIGHT: 116 LBS

## 2025-06-17 DIAGNOSIS — J31.0 CHRONIC RHINITIS: ICD-10-CM

## 2025-06-17 DIAGNOSIS — R42 DIZZINESS AND GIDDINESS: Primary | ICD-10-CM

## 2025-06-17 DIAGNOSIS — M54.2 CERVICALGIA: ICD-10-CM

## 2025-06-17 DIAGNOSIS — J34.2 DEVIATED SEPTUM: ICD-10-CM

## 2025-06-17 PROCEDURE — 1159F MED LIST DOCD IN RCRD: CPT | Performed by: GENERAL PRACTICE

## 2025-06-17 PROCEDURE — 99214 OFFICE O/P EST MOD 30 MIN: CPT | Performed by: GENERAL PRACTICE

## 2025-06-17 PROCEDURE — 1036F TOBACCO NON-USER: CPT | Performed by: GENERAL PRACTICE

## 2025-06-17 ASSESSMENT — PATIENT HEALTH QUESTIONNAIRE - PHQ9
2. FEELING DOWN, DEPRESSED OR HOPELESS: NOT AT ALL
1. LITTLE INTEREST OR PLEASURE IN DOING THINGS: NOT AT ALL
SUM OF ALL RESPONSES TO PHQ9 QUESTIONS 1 AND 2: 0

## 2025-06-17 NOTE — PROGRESS NOTES
Otolaryngology - Head and Neck Surgery Outpatient Established Patient Visit Note        Assessment/Plan:   Problem List Items Addressed This Visit    None  Visit Diagnoses         Codes      Dizziness and giddiness    -  Primary R42      Cervicalgia     M54.2      Chronic rhinitis     J31.0      Deviated septum     J34.2            Had been doing well with good response to PT, though bout of symptoms yesterday.   Pt with neck stress/strain from working prior to bout of symptoms, but also with possible salt load.   Left ear fullness and muffled hearing associated with dizziness, but also neck tension and headache.      Discussed possible contributions of cervicogenic dizziness, possible menieres, possible atypical migraines.   Discussed continued PT due to prior good results.  Discussed possible VNG vs observation for patterns suggestive of other etiologies and pt prefers to avoid VNG for now.     No sinusitis/otitis on exam  Good response to nasacort, continue  Deviated septum but not significantly bothersome currently, pt prefers to avoid interventions.            Follow up:  -plan for follow up in clinic as needed    All of the above findings, impressions, treatment planning and follow up plans were discussed with the patient who indicated understanding.  the patient was instructed to contact or return to clinic sooner if symptoms/signs persist or worsen despite the above management.      Ryan Frank MD  Otolaryngology - Head and Neck Surgery            History Of Present Illness  Tammy Posada is a 65 y.o. female presenting for follow up of CT and audio.    Reports pt had been doing very well with good response to dizziness, neck tension to PT/massage/chiro.  Pt reports she had a bout of dizziness yesterday for the first time in weeks, with minutes of RS vertigo assocaited with left ear fullness, muffled hearing, neck tension and headache.    Neck tension, headache and ear fullness has somewhat persisted, though  dizziness resolved.   Pt reports strain on neck from working in garden prior to onset.   Possible salt load in diet prior.     Audio WNL  CT shows mild deviated septum but no sinusitis.  Nasacort very helpful with baseline congestion.       Recall  Tammy Posada is a 65 y.o. female presenting for concerns for ear pressure/fullness, sinus pressure/pain, discolored rhinorrhea/PND and recent dizziness.      Reports a history of CRS with intermittent sinusitis.   Some onset of symptoms approx 6 weeks ago.   Treated with azithromycin with benefit but not resolution.   Reports ongoing congestion and ear pressure.   Noted to have left sided OME by PCM.  Treated with medrol with some relief.      Reports known deviated septum associated with prior nasal trauma.   No prior nasal surgery.      Reports recent dizziness described as RS vertigo and imbalance intermittently.   Noted onset after turning head to left and setting something down.   Concerns for BPPV but underwent treatments with epley and vestib PT without resolution.   Reports a history of left neck/muscle tension/strain.       No changes in hearing, tinnitus or aural fullness associated with dizziness.         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    Surgical History  She has a past surgical history that includes Laparoscopy diagnostic / biopsy / aspiration / lysis (04/24/2014) and Knee surgery (04/24/2014).     Social History  She reports that she  quit smoking about 17 years ago. Her smoking use included cigarettes. She started smoking about 46 years ago. She has a 14.5 pack-year smoking history. She has never used smokeless tobacco. She reports that she does not drink alcohol and does not use drugs.    Family History  Family History[1]     Allergies  Augmentin [amoxicillin-pot clavulanate], Biaxin [clarithromycin], Pollen extracts, and Prochlorperazine    Review of Systems  ROS: Pertinent positives as noted in HPI.    - CONSTITUTIONAL: Does not report weight loss, fever or chills.    - HEENT:   Ear: Does not report tinnitus,  , hearing loss, otalgia, otorrhea  Nose: Does not report  , rhinorrhea, epistaxis, decreased smell  Throat: Does not report pain, dysphagia, odynophagia  Larynx: Does not report hoarseness,  difficulty breathing, pain with speaking (odynophonia)  Neck: Does not report new masses, pain, swelling  Face: Does not report sinus pain, pressure, swelling, numbness, weakness     - RESPIRATORY: Does not report SOB or cough.    - CV: Does not report palpitations or chest pain.     - GI: Does not report abdominal pain, nausea, vomiting or diarrhea.    - : Does not report dysuria or urinary frequency.    - MSK: Does not report myalgia or joint pain.    - SKIN: Does not report rash or pruritus.    - NEUROLOGICAL: Does not report headache or syncope.    - PSYCHIATRIC: Does not report recent changes in mood. Does not report anxiety or depression.         Physical Exam:     GENERAL:   Alert & Oriented to person, place and time; Normal affect and appearance. Well developed and well nourished. Conversant & cooperative with examination.     HEAD:   Normocephalic, atraumatic. No sinus tenderness to palpation. Normal parotid bilaterally. Normal facial strength.     NEUROLOGIC:   Cranial nerves II-XII grossly intact, gait WNL. Normal mood and affect.    EYES:   Extraocular movements intact. Pupils equal, round, reactive to light and accommodation. No  nystagmus, no ptosis. no scleral injection.    EAR:   Normal auricle. No discomfort or TTP with manipulation.   Handheld otoscopic exam showed normal external auditory canals bilaterally. No purulence or EAC inflammation. Minimal cerumen.   Right tympanic membrane clear and mobile without evidence of perforation, retraction or middle ear effusion.   Left tympanic membrane clear and mobile without evidence of perforation, retraction or middle ear effusion.     NOSE:   No external deformity. No external nasal lesions, lacerations, or scars. Nasal tip symmetrical with normal nasal valves.   Nasal cavity with leftward low   septum, normal mucosa and turbinates. No lesions, masses, purulence or polyps.     OC/OP:   Mucous membranes moist, no masses, lesions or exudates.   Normal tongue, floor of mouth, teeth, gums, lips. Normal posterior pharyngeal wall.    Normal tonsils without erythema, exudate or obvious calculi     NECK:   No neck masses or thyroid enlargement. Trachea midline. No tenderness to palpation    LYMPHATIC:   No cervical lymphadenopathy.     RESPIRATORY:   Symmetric chest elevation & no retractions. No significant hoarseness. No increased work of breathing.    CV:   No clubbing or cyanosis. No obvious edema    Skin:   No facial rashes, vesicles or lesions.     Extremities:   No gross abnormalities      Clinic Procedure        Information review:  External sources (notes, imaging, lab results) listed below personally reviewed to aid in medical decision making process.  -  -  -         [1]   Family History  Problem Relation Name Age of Onset    Hypertension Mother      Prostate cancer Father      Diabetes Father      Hypertension Brother